# Patient Record
(demographics unavailable — no encounter records)

---

## 2024-11-26 NOTE — ADDENDUM
[FreeTextEntry1] : Patient's note was transcribed with the assistance of a medical scribe under the supervision of Dr. Helton. I, Dr. Helton, have reviewed the patient's chart and agree that it aligns with my medical decisions. Angeles Maria, our scribe, also served as a chaperone for physical examination purposes.

## 2024-11-26 NOTE — ASSESSMENT
[FreeTextEntry1] : KELSEY CUNHA is a 49-year-old female who presents for consultation for gross hematuria after intercourse, subsequently with symptoms and imaging, cultures consistent with pyelonephritis.  Will complete gross hematuria workup cystoscopy as we discussed differential diagnosis includes malignancy though suspicion is low at this time She is completing her culture sensitive antibiotic course 7 days for ecoli uti - UA, UCx + UCyt today -  f/u 6 weeks zakia omplete gross hematuria workup. Next visit will perform cystoscopy, discussed this endoscopic procedure with the patient and the associated risks.  ER precautions given if f/c or worsening symptoms

## 2024-11-26 NOTE — PHYSICAL EXAM
[Normal Appearance] : normal appearance [Well Groomed] : well groomed [General Appearance - In No Acute Distress] : no acute distress [Edema] : no peripheral edema [Respiration, Rhythm And Depth] : normal respiratory rhythm and effort [Exaggerated Use Of Accessory Muscles For Inspiration] : no accessory muscle use [Abdomen Soft] : soft [Abdomen Tenderness] : non-tender [Costovertebral Angle Tenderness] : no ~M costovertebral angle tenderness [Normal Station and Gait] : the gait and station were normal for the patient's age [] : no rash [No Focal Deficits] : no focal deficits [Oriented To Time, Place, And Person] : oriented to person, place, and time [Affect] : the affect was normal [Mood] : the mood was normal [de-identified] : mild LUQ tenderness. suprapubic tenderness.  [Chaperone Present] : A chaperone was present in the examining room during all aspects of the physical examination

## 2024-11-26 NOTE — HISTORY OF PRESENT ILLNESS
[FreeTextEntry1] : KELSEY CUNHA is a 49-year-old female who presents for consultation for gross hematuria.    Pt had sudden pain during intercourse and the next day had pressure sensation in vagina with left flank pain. She also had gross hematuria and states she felt blood clots pass through. At the time she had urinary frequency, dysuria and fevers/chills. She had a positive UCx and symptoms resolved on 7-day course of cipro which the patient is currently taking.  Her  is a physician assistant and brought the prescription.  Based on cultures.  She saw gynecology and had a normal pelvic exam. Denies flank pain, gross hematuria, dysuria or associated symptoms.   CT AP w/wo IV Cont 11/22/2024 - I agree with the findings. No stones. No masses. Agree there is bilateral hyperenhancement of the ureters and collecting system without discreet masses. No upper tract filling defects. IMPRESSION: Thickening and enhancement of bilateral ureters and the urinary bladder, consistent with cystitis and pyelitis. No hydronephrosis. No urinary tract calculi. Kidneys unremarkable. (Kidneys Parenchyma: Symmetric and prompt enhancement. No parenchymal lesions. Collecting system and the ureters. Bilateral thickening and enhancement of the renal pelvis and the ureters, slightly more on the left than on the right, consistent with pyelitis and urethritis.)   history: Denies previous kidney stones, recurrent UTIs. No instrumentation. Family History: denies  malignancies. Social History: former , former smoker of 5 years

## 2025-01-16 NOTE — HISTORY OF PRESENT ILLNESS
[FreeTextEntry1] : KELSEY CUNHA is a 50-year-old female who presents for consultation for gross hematuria after intercourse, subsequently with symptoms and imaging, cultures consistent with pyelonephritis.   Pt presents today for a cystoscopy. Denies flank pain, gross hematuria, dysuria or associated symptoms.   UA 11/26/2024 - negative UCx - no growth  UCyt - NEGATIVE FOR HIGH GRADE UROTHELIAL CARCINOMA., Benign urothelial and squamous cells. Few red blood cells  previously,  Pt had sudden pain during intercourse and the next day had pressure sensation in vagina with left flank pain. She also had gross hematuria and states she felt blood clots pass through. At the time she had urinary frequency, dysuria and fevers/chills. She had a positive UCx and symptoms resolved on 7-day course of cipro which the patient is currently taking.  Her  is a physician assistant and brought the prescription.  Based on cultures.  She saw gynecology and had a normal pelvic exam. Denies flank pain, gross hematuria, dysuria or associated symptoms.   CT AP w/wo IV Cont 11/22/2024 - I agree with the findings. No stones. No masses. Agree there is bilateral hyperenhancement of the ureters and collecting system without discreet masses. No upper tract filling defects. IMPRESSION: Thickening and enhancement of bilateral ureters and the urinary bladder, consistent with cystitis and pyelitis. No hydronephrosis. No urinary tract calculi. Kidneys unremarkable. (Kidneys Parenchyma: Symmetric and prompt enhancement. No parenchymal lesions. Collecting system and the ureters. Bilateral thickening and enhancement of the renal pelvis and the ureters, slightly more on the left than on the right, consistent with pyelitis and urethritis.)   history: Denies previous kidney stones, recurrent UTIs. No instrumentation. Family History: denies  malignancies. Social History: former , former smoker of 5 years

## 2025-01-16 NOTE — ADDENDUM
[FreeTextEntry1] : Patient's note was transcribed with the assistance of a medical scribe under the supervision of Dr. Helton. I, Dr. Helton, have reviewed the patient's chart and agree that it aligns with my medical decisions. Angeles Maria, our scribe, also served as a chaperone for physical examination purposes.  The submitted E/M billing level for this visit reflects the total time spent on the day of the visit including face-to-face time spent with the patient, non-face-to-face review of medical records and relevant information, documentation, and asynchronous communication with the patient after a visit via phone, email, or patients EHR portal after the visit.  The medical records reviewed are either scanned into the chart or reviewed with the patient using a patients electronic medical records portal for patients with records not available to [facility] via electronic transmission platforms from other institutions and labs.  Time spend counseling and performing coordination of care was also included in determining the appropriate EM billing level.